# Patient Record
Sex: MALE | Race: BLACK OR AFRICAN AMERICAN | NOT HISPANIC OR LATINO | URBAN - METROPOLITAN AREA
[De-identification: names, ages, dates, MRNs, and addresses within clinical notes are randomized per-mention and may not be internally consistent; named-entity substitution may affect disease eponyms.]

---

## 2020-10-18 ENCOUNTER — EMERGENCY (EMERGENCY)
Facility: HOSPITAL | Age: 50
LOS: 1 days | Discharge: HOME | End: 2020-10-18
Attending: EMERGENCY MEDICINE
Payer: MEDICAID

## 2020-10-18 VITALS
WEIGHT: 259.93 LBS | HEART RATE: 104 BPM | SYSTOLIC BLOOD PRESSURE: 164 MMHG | DIASTOLIC BLOOD PRESSURE: 92 MMHG | OXYGEN SATURATION: 100 % | RESPIRATION RATE: 19 BRPM | HEIGHT: 70 IN | TEMPERATURE: 98 F

## 2020-10-18 LAB
ALBUMIN SERPL ELPH-MCNC: 4.4 G/DL — SIGNIFICANT CHANGE UP (ref 3.3–5)
ALP SERPL-CCNC: 75 U/L — SIGNIFICANT CHANGE UP (ref 40–120)
ALT FLD-CCNC: 27 U/L — SIGNIFICANT CHANGE UP (ref 10–45)
ANION GAP SERPL CALC-SCNC: 12 MMOL/L — SIGNIFICANT CHANGE UP (ref 5–17)
AST SERPL-CCNC: 28 U/L — SIGNIFICANT CHANGE UP (ref 10–40)
BASOPHILS # BLD AUTO: 0.03 K/UL — SIGNIFICANT CHANGE UP (ref 0–0.2)
BASOPHILS NFR BLD AUTO: 0.2 % — SIGNIFICANT CHANGE UP (ref 0–2)
BILIRUB SERPL-MCNC: 1 MG/DL — SIGNIFICANT CHANGE UP (ref 0.2–1.2)
BUN SERPL-MCNC: 11 MG/DL — SIGNIFICANT CHANGE UP (ref 7–23)
CALCIUM SERPL-MCNC: 9.8 MG/DL — SIGNIFICANT CHANGE UP (ref 8.4–10.5)
CHLORIDE SERPL-SCNC: 103 MMOL/L — SIGNIFICANT CHANGE UP (ref 96–108)
CO2 SERPL-SCNC: 25 MMOL/L — SIGNIFICANT CHANGE UP (ref 22–31)
CREAT SERPL-MCNC: 1.35 MG/DL — HIGH (ref 0.5–1.3)
EOSINOPHIL # BLD AUTO: 0 K/UL — SIGNIFICANT CHANGE UP (ref 0–0.5)
EOSINOPHIL NFR BLD AUTO: 0 % — SIGNIFICANT CHANGE UP (ref 0–6)
GLUCOSE SERPL-MCNC: 151 MG/DL — HIGH (ref 70–99)
HCT VFR BLD CALC: 49.2 % — SIGNIFICANT CHANGE UP (ref 39–50)
HGB BLD-MCNC: 15.9 G/DL — SIGNIFICANT CHANGE UP (ref 13–17)
IMM GRANULOCYTES NFR BLD AUTO: 0.5 % — SIGNIFICANT CHANGE UP (ref 0–1.5)
LIDOCAIN IGE QN: 19 U/L — SIGNIFICANT CHANGE UP (ref 7–60)
LYMPHOCYTES # BLD AUTO: 0.96 K/UL — LOW (ref 1–3.3)
LYMPHOCYTES # BLD AUTO: 6.8 % — LOW (ref 13–44)
MCHC RBC-ENTMCNC: 26.4 PG — LOW (ref 27–34)
MCHC RBC-ENTMCNC: 32.3 GM/DL — SIGNIFICANT CHANGE UP (ref 32–36)
MCV RBC AUTO: 81.6 FL — SIGNIFICANT CHANGE UP (ref 80–100)
MONOCYTES # BLD AUTO: 0.76 K/UL — SIGNIFICANT CHANGE UP (ref 0–0.9)
MONOCYTES NFR BLD AUTO: 5.4 % — SIGNIFICANT CHANGE UP (ref 2–14)
NEUTROPHILS # BLD AUTO: 12.35 K/UL — HIGH (ref 1.8–7.4)
NEUTROPHILS NFR BLD AUTO: 87.1 % — HIGH (ref 43–77)
NRBC # BLD: 0 /100 WBCS — SIGNIFICANT CHANGE UP (ref 0–0)
PLATELET # BLD AUTO: 152 K/UL — SIGNIFICANT CHANGE UP (ref 150–400)
POTASSIUM SERPL-MCNC: 4.1 MMOL/L — SIGNIFICANT CHANGE UP (ref 3.5–5.3)
POTASSIUM SERPL-SCNC: 4.1 MMOL/L — SIGNIFICANT CHANGE UP (ref 3.5–5.3)
PROT SERPL-MCNC: 8.2 G/DL — SIGNIFICANT CHANGE UP (ref 6–8.3)
RBC # BLD: 6.03 M/UL — HIGH (ref 4.2–5.8)
RBC # FLD: 13.8 % — SIGNIFICANT CHANGE UP (ref 10.3–14.5)
SODIUM SERPL-SCNC: 140 MMOL/L — SIGNIFICANT CHANGE UP (ref 135–145)
WBC # BLD: 14.17 K/UL — HIGH (ref 3.8–10.5)
WBC # FLD AUTO: 14.17 K/UL — HIGH (ref 3.8–10.5)

## 2020-10-18 PROCEDURE — 99285 EMERGENCY DEPT VISIT HI MDM: CPT

## 2020-10-18 RX ORDER — SODIUM CHLORIDE 9 MG/ML
1000 INJECTION, SOLUTION INTRAVENOUS ONCE
Refills: 0 | Status: COMPLETED | OUTPATIENT
Start: 2020-10-18 | End: 2020-10-18

## 2020-10-18 RX ORDER — ONDANSETRON 8 MG/1
4 TABLET, FILM COATED ORAL ONCE
Refills: 0 | Status: COMPLETED | OUTPATIENT
Start: 2020-10-18 | End: 2020-10-18

## 2020-10-18 RX ORDER — MORPHINE SULFATE 50 MG/1
4 CAPSULE, EXTENDED RELEASE ORAL ONCE
Refills: 0 | Status: DISCONTINUED | OUTPATIENT
Start: 2020-10-18 | End: 2020-10-18

## 2020-10-18 RX ADMIN — ONDANSETRON 4 MILLIGRAM(S): 8 TABLET, FILM COATED ORAL at 22:39

## 2020-10-18 RX ADMIN — SODIUM CHLORIDE 1000 MILLILITER(S): 9 INJECTION, SOLUTION INTRAVENOUS at 22:39

## 2020-10-18 RX ADMIN — MORPHINE SULFATE 4 MILLIGRAM(S): 50 CAPSULE, EXTENDED RELEASE ORAL at 22:39

## 2020-10-18 NOTE — ED PROVIDER NOTE - OBJECTIVE STATEMENT
50 year old otherwise healthy male presenting with RLQ abdominal pain and N/V x 1 day. Patient states waking up this morning with sensation of abdomen feeling "bubbly", 50 year old otherwise healthy male presenting with RLQ abdominal pain and N/V x 1 day. Patient states waking up this morning with sensation of abdomen feeling "bubbly", ate lunch, began to feel nausea and had 7-8 episodes of NBNB vomiting. Also reports gradual onset of lower abdominal pain greatest in RLQ, described as constant pressure, now 8/10. Has not had BM since yesterday, feels urge to defecate but unable to. States urine today was also "dark brown". Denies fever, chills, diarrhea, 50 year old otherwise healthy male presenting with RLQ abdominal pain and N/V x 1 day. Patient states waking up this morning with sensation of abdomen feeling "bubbly", ate lunch, began to feel nausea and had 7-8 episodes of NBNB vomiting. Also reports gradual onset of lower abdominal pain greatest in RLQ, described as constant pressure, now 8/10. Has not had BM since yesterday, feels urge to defecate but unable to. States urine today was also "dark brown". Denies fever, chills, diarrhea, inability to pass gas, previous abdominal surgeries, syncope.

## 2020-10-18 NOTE — ED ADULT NURSE NOTE - VOIDING
[No Acute Distress] : no acute distress [Well Nourished] : well nourished [Well Developed] : well developed [Well-Appearing] : well-appearing [Normal Sclera/Conjunctiva] : normal sclera/conjunctiva [PERRL] : pupils equal round and reactive to light [EOMI] : extraocular movements intact [Normal Outer Ear/Nose] : the outer ears and nose were normal in appearance [Normal Oropharynx] : the oropharynx was normal [Normal TMs] : both tympanic membranes were normal without difficulty [Normal Nasal Mucosa] : the nasal mucosa was normal [No JVD] : no jugular venous distention [Supple] : supple [No Lymphadenopathy] : no lymphadenopathy [Thyroid Normal, No Nodules] : the thyroid was normal and there were no nodules present [No Respiratory Distress] : no respiratory distress  [Clear to Auscultation] : lungs were clear to auscultation bilaterally [No Accessory Muscle Use] : no accessory muscle use [Normal Rate] : normal rate  [Regular Rhythm] : with a regular rhythm [Normal S1, S2] : normal S1 and S2 [No Murmur] : no murmur heard [Soft] : abdomen soft [Non Tender] : non-tender [Non-distended] : non-distended [No Masses] : no abdominal mass palpated [Normal Posterior Cervical Nodes] : no posterior cervical lymphadenopathy [Normal Anterior Cervical Nodes] : no anterior cervical lymphadenopathy [No CVA Tenderness] : no CVA  tenderness [No Spinal Tenderness] : no spinal tenderness [No Joint Swelling] : no joint swelling [Grossly Normal Strength/Tone] : grossly normal strength/tone [No Rash] : no rash [Normal Gait] : normal gait [Coordination Grossly Intact] : coordination grossly intact [No Focal Deficits] : no focal deficits [Deep Tendon Reflexes (DTR)] : deep tendon reflexes were 2+ and symmetric [Normal Affect] : the affect was normal [Normal Insight/Judgement] : insight and judgment were intact [de-identified] : tender b/l sinuses frontal.

## 2020-10-18 NOTE — ED PROVIDER NOTE - PROGRESS NOTE DETAILS
[facility] PGY-1: Discussed urology recs for patient to stay in CDU overnight for AM labs and pain control. Patient expresses desire to be discharge home with outpatient follow up, would like to try pain control at home. Declines CDU at this time. Discussed return precautions and need for close follow up. Patient expresses clear understanding. The radiology read was discussed with urology who recommended CDU placement and hydration.  Patient with 2mm stone and Creat of 1.3.  Patient defers further evaluation and observation in CDU and we will obtain close urology follow-up as outpatient.  Patient currently stable.

## 2020-10-18 NOTE — ED PROVIDER NOTE - PHYSICAL EXAMINATION
Gen - NAD; well appearing; A+Ox3   HEENT - NCAT, EOMI, PERRL, moist mucous membranes  Neck - supple, no LAD or swelling  Resp - CTAB, symmetric breath sounds  CV -  RRR, no murmurs  Abd - obese abdomen; soft, ND, tender mildly in the RLQ; no guarding or rebound, negative resendiz's, normoactive BS throughout  MSK - 5/5 strength and FROM b/l UE and LE  Extrem - 3+ distal pulses b/L UE and LE; no cyanosis, clubbing, or edema  Skin - no rash or bruising, warm and well perfused  Neuro - no focal motor or sensation deficits

## 2020-10-18 NOTE — ED PROVIDER NOTE - NSFOLLOWUPINSTRUCTIONS_ED_ALL_ED_FT
YOU WERE SEEN IN THE ED FOR: KIDNEY STONE    YOU WERE PRESCRIBED: FLOMAX AND OXYCODONE  FOLLOW THE INSTRUCTIONS ON THE LABEL/CONTAINER    FOR PAIN/FEVER, YOU MAY TAKE TYLENOL (acetaminophen) AND/OR IBUPROFEN (advil or motrin). FOLLOW THE INSTRUCTIONS ON THE LABEL/CONTAINER.    PLEASE FOLLOW UP WITH OUR UROLOGY CLINIC IN THE NEXT 3-5 DAYS AS DISCUSSED.    RETURN TO THE EMERGENCY DEPARTMENT IF YOU EXPERIENCE ANY NEW/CONCERNING/WORSENING SYMPTOMS SUCH AS BUT NOT LIMITED TO: FEVER, CHILLS, FAINTING, PERSISTENT VOMITING, BLEEDING

## 2020-10-18 NOTE — ED ADULT NURSE NOTE - NSIMPLEMENTINTERV_GEN_ALL_ED
Implemented All Universal Safety Interventions:  [place] to call system. Call bell, personal items and telephone within reach. Instruct patient to call for assistance. Room bathroom lighting operational. Non-slip footwear when patient is off stretcher. Physically safe environment: no spills, clutter or unnecessary equipment. Stretcher in lowest position, wheels locked, appropriate side rails in place.

## 2020-10-18 NOTE — ED PROVIDER NOTE - NSFOLLOWUPCLINICS_GEN_ALL_ED_FT
[facility] - Urology  Urology  [address]th [place] [address]  Phone: [phone]  Fax:   Follow Up Time:

## 2020-10-18 NOTE — ED ADULT NURSE NOTE - OBJECTIVE STATEMENT
50 y.o. male presents to ED c/o RLQ pain starting today. Pt reports the pain is a constant "cramping, bubbling" sensation. Pt reports multiple episodes of nonbloody emesis and one episode of "brown urine." Pt reports "feeling of having to make a BM but unable to." Pt denies burning with urination, increased frequency, blood in urine/stool, CP, SOB, fevers, chills, body aches. Upon assessment, pt a&Ox3, nad, no increased WoB noted, skin warm and appropriate color, no diaphoresis noted, abdomen soft, tenderness noted to RLQ palpation. MD at bedside. Pt safety and comfort provided.

## 2020-10-18 NOTE — ED PROVIDER NOTE - PATIENT PORTAL LINK FT
You can access the FollowMyHealth Patient Portal offered by [facility] by registering at the following website: [email or web address]. By joining [employer]’s FollowMyHealth portal, you will also be able to view your health information using other applications (apps) compatible with our system.

## 2020-10-18 NOTE — ED PROVIDER NOTE - NS ED ROS FT
Gen: No fever, no weight loss, no fatigue  CV: No chest pain, no palpitations  HEENT: No sore throat, no hoarseness  Skin: No rash, no color changes  Resp: No SOB, no cough  Endo: No sensitivity to heat or cold, no appetite changes  GI: No constipation, no diarrhea, +nausea, +vomiting  Msk: No back pain, no LE swelling, no extremity pain  : No dysuria, no increased frequency  Neuro: No LOC, no weakness, no numbness

## 2020-10-19 VITALS
HEART RATE: 65 BPM | OXYGEN SATURATION: 100 % | RESPIRATION RATE: 18 BRPM | SYSTOLIC BLOOD PRESSURE: 130 MMHG | TEMPERATURE: 98 F | DIASTOLIC BLOOD PRESSURE: 78 MMHG

## 2020-10-19 LAB
APPEARANCE UR: CLEAR — SIGNIFICANT CHANGE UP
BACTERIA # UR AUTO: NEGATIVE — SIGNIFICANT CHANGE UP
BILIRUB UR-MCNC: NEGATIVE — SIGNIFICANT CHANGE UP
COLOR SPEC: YELLOW — SIGNIFICANT CHANGE UP
DIFF PNL FLD: ABNORMAL
EPI CELLS # UR: 1 /HPF — SIGNIFICANT CHANGE UP
GLUCOSE UR QL: ABNORMAL
HYALINE CASTS # UR AUTO: 0 /LPF — SIGNIFICANT CHANGE UP (ref 0–2)
KETONES UR-MCNC: ABNORMAL
LEUKOCYTE ESTERASE UR-ACNC: NEGATIVE — SIGNIFICANT CHANGE UP
NITRITE UR-MCNC: NEGATIVE — SIGNIFICANT CHANGE UP
PH UR: 8 — SIGNIFICANT CHANGE UP (ref 5–8)
PROT UR-MCNC: ABNORMAL
RBC CASTS # UR COMP ASSIST: 5 /HPF — HIGH (ref 0–4)
SP GR SPEC: 1.03 — HIGH (ref 1.01–1.02)
UROBILINOGEN FLD QL: NEGATIVE — SIGNIFICANT CHANGE UP
WBC UR QL: 1 /HPF — SIGNIFICANT CHANGE UP (ref 0–5)

## 2020-10-19 PROCEDURE — 83690 ASSAY OF LIPASE: CPT

## 2020-10-19 PROCEDURE — 99283 EMERGENCY DEPT VISIT LOW MDM: CPT

## 2020-10-19 PROCEDURE — 81001 URINALYSIS AUTO W/SCOPE: CPT

## 2020-10-19 PROCEDURE — 85025 COMPLETE CBC W/AUTO DIFF WBC: CPT

## 2020-10-19 PROCEDURE — 80053 COMPREHEN METABOLIC PANEL: CPT

## 2020-10-19 PROCEDURE — 74177 CT ABD & PELVIS W/CONTRAST: CPT

## 2020-10-19 PROCEDURE — 74177 CT ABD & PELVIS W/CONTRAST: CPT | Mod: 26

## 2020-10-19 PROCEDURE — 96374 THER/PROPH/DIAG INJ IV PUSH: CPT | Mod: XU

## 2020-10-19 PROCEDURE — 96375 TX/PRO/DX INJ NEW DRUG ADDON: CPT

## 2020-10-19 PROCEDURE — 99284 EMERGENCY DEPT VISIT MOD MDM: CPT | Mod: 25

## 2020-10-19 RX ORDER — TAMSULOSIN HYDROCHLORIDE 0.4 MG/1
1 CAPSULE ORAL
Qty: 30 | Refills: 0
Start: 2020-10-19 | End: 2020-11-17

## 2020-10-19 RX ORDER — ONDANSETRON 8 MG/1
1 TABLET, FILM COATED ORAL
Qty: 9 | Refills: 0
Start: 2020-10-19 | End: 2020-10-21

## 2020-10-19 RX ORDER — TAMSULOSIN HYDROCHLORIDE 0.4 MG/1
1 CAPSULE ORAL
Qty: 60 | Refills: 0
Start: 2020-10-19 | End: 2020-10-28

## 2020-10-19 RX ORDER — OXYCODONE HYDROCHLORIDE 5 MG/1
1 TABLET ORAL
Qty: 12 | Refills: 0
Start: 2020-10-19 | End: 2020-10-21

## 2020-10-19 NOTE — CONSULT NOTE ADULT - SUBJECTIVE AND OBJECTIVE BOX
pt 49 y/o M,  came to the ED c/o RLQ pain since noon. pt states waking up this morning with sensation of abdomen feeling "bubbly", ate lunch, start has this RLQ pain. The pain was sharp, 9/10, constant, and  no radiation. Had (+/-) nausea and vomited. Denies fever and chills. Denies hematuria, dysuria nor any other urinary symptoms. Urine is tea color. Patient had CT abdominal found to have a 2mm distal stone in the R ureter w/ Hydronephrosis.         PAST MEDICAL & SURGICAL HISTORY:  No pertinent past medical history    No significant past surgical history      FAMILY HISTORY:    SOCIAL HISTORY:   Tobacco hx:  No Known Allergies      REVIEW OF SYSTEMS: Pertinent positives and negatives as stated in HPI, otherwise negative    Vital signs      Output      Physical Exam  Gen: NAD  Pulm: No respiratory distress, no subcostal retractions  CV: RRR, no JVD  Abd: Soft, RLQ tenderness, ND  Back: no CVAT B/L  : voiding free, tea color urine  MSK: No edema present    LABS:        10-18 @ 22:08    WBC 14.17 / Hct 49.2  / SCr 1.35     10-18    140  |  103  |  11  ----------------------------<  151<H>  4.1   |  25  |  1.35<H>    Ca    9.8      18 Oct 2020 22:08    TPro  8.2  /  Alb  4.4  /  TBili  1.0  /  DBili  x   /  AST  28  /  ALT  27  /  AlkPhos  75  10-18      Urinalysis Basic - ( 19 Oct 2020 01:41 )    Color: Yellow / Appearance: Clear / S.034 / pH: x  Gluc: x / Ketone: Small  / Bili: Negative / Urobili: Negative   Blood: x / Protein: 30 mg/dL / Nitrite: Negative   Leuk Esterase: Negative / RBC: 5 /hpf / WBC 1 /HPF   Sq Epi: x / Non Sq Epi: 1 /hpf / Bacteria: Negative        Urine Cx:   Blood Cx:    Radiology:  < from: CT Abdomen and Pelvis w/ Oral Cont and w/ IV Cont (10.19.20 @ 00:12) >    Right kidney/ureter: Mild right hydronephrosis and mild right hydroureter to the level of a 2 mm distal ureteral calculus (2:108). Delayed right renal nephrogram and perinephric fat stranding and free fluid. These constellations of findings are concerning for acute obstructive uropathy possible forniceal rupture. Punctate nonobstructing calculus in the lower pole of the right kidney and 601:56. 1.3 cm cyst.    Left kidney/ureter: Within normal limits. Subcentimeter hypodensity which is too small to characterize.    BLADDER: Within normal limits.  REPRODUCTIVE ORGANS: Prostate is enlarged.    BOWEL: No bowel obstruction or bowel wall thickening. Appendix is normal.  PERITONEUM: Small amount of fluid tracking along the perirenal space.  VESSELS: Within normal limits.  RETROPERITONEUM/LYMPH NODES: No lymphadenopathy.  ABDOMINAL WALL: Within normal limits.  BONES: Within normal limits.    IMPRESSION:  Constellation of findings compatible with acute right obstructive uropathy and possible forniceal rupture, as described above.      < end of copied text >         pt 51 y/o M,  came to the ED c/o RLQ pain since noon. pt states waking up this morning with sensation of abdomen feeling "bubbly", ate lunch, start has this RLQ pain. The pain was sharp, 9/10, constant, and  no radiation. Had nausea and vomited. Denies fever and chills. Denies hematuria, dysuria nor any other urinary symptoms. Urine is tea color. Patient had CT abdominal found to have a 2mm distal stone in the R ureter w/ Hydronephrosis.         PAST MEDICAL & SURGICAL HISTORY:  No pertinent past medical history    No significant past surgical history      FAMILY HISTORY:    SOCIAL HISTORY:   Tobacco hx:  No Known Allergies      REVIEW OF SYSTEMS: Pertinent positives and negatives as stated in HPI, otherwise negative    Vital signs      Output      Physical Exam  Gen: NAD  Pulm: No respiratory distress, no subcostal retractions  CV: RRR, no JVD  Abd: Soft, RLQ tenderness, ND  Back: no CVAT B/L  : voiding free, tea color urine  MSK: No edema present    LABS:        10-18 @ 22:08    WBC 14.17 / Hct 49.2  / SCr 1.35     10-18    140  |  103  |  11  ----------------------------<  151<H>  4.1   |  25  |  1.35<H>    Ca    9.8      18 Oct 2020 22:08    TPro  8.2  /  Alb  4.4  /  TBili  1.0  /  DBili  x   /  AST  28  /  ALT  27  /  AlkPhos  75  10-18      Urinalysis Basic - ( 19 Oct 2020 01:41 )    Color: Yellow / Appearance: Clear / S.034 / pH: x  Gluc: x / Ketone: Small  / Bili: Negative / Urobili: Negative   Blood: x / Protein: 30 mg/dL / Nitrite: Negative   Leuk Esterase: Negative / RBC: 5 /hpf / WBC 1 /HPF   Sq Epi: x / Non Sq Epi: 1 /hpf / Bacteria: Negative        Urine Cx:   Blood Cx:    Radiology:  < from: CT Abdomen and Pelvis w/ Oral Cont and w/ IV Cont (10.19.20 @ 00:12) >    Right kidney/ureter: Mild right hydronephrosis and mild right hydroureter to the level of a 2 mm distal ureteral calculus (2:108). Delayed right renal nephrogram and perinephric fat stranding and free fluid. These constellations of findings are concerning for acute obstructive uropathy possible forniceal rupture. Punctate nonobstructing calculus in the lower pole of the right kidney and 601:56. 1.3 cm cyst.    Left kidney/ureter: Within normal limits. Subcentimeter hypodensity which is too small to characterize.    BLADDER: Within normal limits.  REPRODUCTIVE ORGANS: Prostate is enlarged.    BOWEL: No bowel obstruction or bowel wall thickening. Appendix is normal.  PERITONEUM: Small amount of fluid tracking along the perirenal space.  VESSELS: Within normal limits.  RETROPERITONEUM/LYMPH NODES: No lymphadenopathy.  ABDOMINAL WALL: Within normal limits.  BONES: Within normal limits.    IMPRESSION:  Constellation of findings compatible with acute right obstructive uropathy and possible forniceal rupture, as described above.      < end of copied text >

## 2020-10-19 NOTE — ED POST DISCHARGE NOTE - ADDITIONAL DOCUMENTATION
pharmacy called and lvm as well as patient stating flomax written q4h. New Rx sent, called pharmacy to notify of change, canceled incorrect Rx. also called patient to ensure understanding of the dosage of each medication - Swathi Scott PA-C

## 2020-10-19 NOTE — CONSULT NOTE ADULT - ASSESSMENT
pt 49 y/o M,  came to the ED c/o RLQ pain since noon. pt states waking up this morning with sensation of abdomen feeling "bubbly", ate lunch, start has this RLQ pain. The pain was sharp, 9/10, constant, and  no radiation. Had (+/-) nausea and vomited. Denies fever and chills. Denies hematuria, dysuria nor any other urinary symptoms. Urine is tea color. Patient had CT abdominal found to have a 2mm distal stone in the R ureter w/ Hydronephrosis. Currently pt AVSS, pain in control. Elevated WBC and Cr.     plan   pt 49 y/o M,  came to the ED c/o RLQ pain since noon. pt states waking up this morning with sensation of abdomen feeling "bubbly", ate lunch, start has this RLQ pain. The pain was sharp, 9/10, constant, and  no radiation. Had (+/-) nausea and vomited. Denies fever and chills. Denies hematuria, dysuria nor any other urinary symptoms. Urine is tea color. Patient had CT abdominal found to have a 2mm distal stone in the R ureter w/ Hydronephrosis. Currently pt AVSS, pain in control. Elevated WBC and Cr.     plan  No urgent urologic intervention at this time   Recommend CDU for observation due possible forniceal rupture   Repeat lab at AM  Recommend medical expulsion therapy with adequate hydration  Flomax 0.4mg qhs  PO analgesia  strain urine for calculi  f/u urine culture  regular diet   pt 49 y/o M,  came to the ED c/o RLQ pain since noon. pt states waking up this morning with sensation of abdomen feeling "bubbly", ate lunch, start has this RLQ pain. The pain was sharp, 9/10, constant, and  no radiation. Had (+/-) nausea and vomited. Denies fever and chills. Denies hematuria, dysuria nor any other urinary symptoms. Urine is tea color. Patient had CT abdominal found to have a 2mm distal stone in the R ureter w/ Hydronephrosis. Currently pt AVSS, pain in control. Elevated WBC and Cr.     plan  No urgent urologic intervention at this time   Recommend CDU for observation due possible forniceal rupture  no need for intervention currently, but need to make sure creatinine is not rising  Repeat lab at AM  Recommend medical expulsion therapy with adequate hydration  Flomax 0.4mg qhs  PO analgesia  strain urine for calculi  f/u urine culture  regular diet

## 2020-10-29 ENCOUNTER — APPOINTMENT (OUTPATIENT)
Dept: UROLOGY | Facility: CLINIC | Age: 50
End: 2020-10-29
Payer: MEDICAID

## 2020-10-29 VITALS
WEIGHT: 257 LBS | RESPIRATION RATE: 18 BRPM | OXYGEN SATURATION: 98 % | DIASTOLIC BLOOD PRESSURE: 103 MMHG | HEART RATE: 90 BPM | TEMPERATURE: 97.2 F | BODY MASS INDEX: 38.06 KG/M2 | SYSTOLIC BLOOD PRESSURE: 167 MMHG | HEIGHT: 69 IN

## 2020-10-29 DIAGNOSIS — N13.30 UNSPECIFIED HYDRONEPHROSIS: ICD-10-CM

## 2020-10-29 DIAGNOSIS — N20.1 CALCULUS OF URETER: ICD-10-CM

## 2020-10-29 DIAGNOSIS — N23 UNSPECIFIED RENAL COLIC: ICD-10-CM

## 2020-10-29 DIAGNOSIS — Z78.9 OTHER SPECIFIED HEALTH STATUS: ICD-10-CM

## 2020-10-29 PROCEDURE — 99204 OFFICE O/P NEW MOD 45 MIN: CPT

## 2020-10-29 RX ORDER — OXYCODONE HYDROCHLORIDE 30 MG/1
TABLET ORAL
Refills: 0 | Status: ACTIVE | COMMUNITY

## 2020-10-29 RX ORDER — TAMSULOSIN HYDROCHLORIDE 0.4 MG/1
0.4 CAPSULE ORAL
Refills: 0 | Status: ACTIVE | COMMUNITY

## 2020-10-29 NOTE — ASSESSMENT
[FreeTextEntry1] : Patient is a 49 yo M who presents with R renal colic, R distal 2 mm ureteral stone with hydro.\par \par D/w pt that stone should spontaneously pass - would plan for renal ULT in 2 wks to assess and observe.\par D/w pt he should increase fluid hydration.  Counseled pt on stone dietary recommendations, such as increasing fluid intake and citrate intake (sanjeev, grapefruit), while decreasing salt, protein and oxalate. \par For constipation encouraged pt to hydrate and take stool softeners\par For itch, d/w pt may be medication related, instructed to hold flomax for a few days to determine if itch resolves\par If no change, then cont flomax\par Strain urine\par Urine for cx\par D/w pt that if develops fever/chills or worsening symptoms to go back to ER for intervention\par Suspect BP related to renal colic, will check BP next visit as pt otherwise no symptoms\par F/u 2 wks\par

## 2020-10-29 NOTE — HISTORY OF PRESENT ILLNESS
[FreeTextEntry1] : Patient is a 51 y/o M who presents for R renal colic.  He initially presented to the ED c/o RLQ pain since noon 10/19.  Pt states waking up this date/morning with sensation of abdomen feeling "bubbly", ate lunch, start has this RLQ pain. The pain was sharp, 9/10, constant, and no radiation. Had nausea and\par vomited. Denies fever and chills. Denies hematuria, dysuria nor any other urinary symptoms. Urine is tea color. Patient had CT abdominal found to have a 2mm distal stone in the R ureter w/ Hydronephrosis.\par \par He was sent home with flomax and oxycodone.  He is only taking flomax and ibuprofen.  Minimal improved pain, more in R flank pain.  No urinary symptoms.  1st stone episode.\par \par He does report significant constipation.  First BM this morning in 5 days.  Also reports significant itching all over his body.

## 2020-10-29 NOTE — PHYSICAL EXAM
[General Appearance - Well Developed] : well developed [General Appearance - Well Nourished] : well nourished [Normal Appearance] : normal appearance [Well Groomed] : well groomed [General Appearance - In No Acute Distress] : no acute distress [Edema] : no peripheral edema [] : no respiratory distress [Respiration, Rhythm And Depth] : normal respiratory rhythm and effort [Exaggerated Use Of Accessory Muscles For Inspiration] : no accessory muscle use [Abdomen Soft] : soft [Abdomen Tenderness] : non-tender [Costovertebral Angle Tenderness] : no ~M costovertebral angle tenderness [Urinary Bladder Findings] : the bladder was normal on palpation [Normal Station and Gait] : the gait and station were normal for the patient's age [Skin Color & Pigmentation] : normal skin color and pigmentation [Skin Turgor] : supple [No Focal Deficits] : no focal deficits [Oriented To Time, Place, And Person] : oriented to person, place, and time [Affect] : the affect was normal [Mood] : the mood was normal [Not Anxious] : not anxious

## 2020-11-02 ENCOUNTER — NON-APPOINTMENT (OUTPATIENT)
Age: 50
End: 2020-11-02

## 2020-11-02 LAB — BACTERIA UR CULT: NORMAL

## 2020-11-12 ENCOUNTER — APPOINTMENT (OUTPATIENT)
Dept: UROLOGY | Facility: CLINIC | Age: 50
End: 2020-11-12

## 2022-07-07 ENCOUNTER — EMERGENCY (EMERGENCY)
Facility: HOSPITAL | Age: 52
LOS: 1 days | Discharge: HOME | End: 2022-07-07
Attending: EMERGENCY MEDICINE
Payer: MEDICAID

## 2022-07-07 VITALS
HEART RATE: 76 BPM | DIASTOLIC BLOOD PRESSURE: 92 MMHG | OXYGEN SATURATION: 97 % | HEIGHT: 70 IN | RESPIRATION RATE: 17 BRPM | TEMPERATURE: 98 F | SYSTOLIC BLOOD PRESSURE: 163 MMHG | WEIGHT: 259.93 LBS

## 2022-07-07 PROCEDURE — 99284 EMERGENCY DEPT VISIT MOD MDM: CPT

## 2022-07-07 NOTE — ED ADULT TRIAGE NOTE - HEIGHT IN INCHES
CM: Called Delia at [facility] to update that MD note indicated pt here 4-7 days yet    SW has not heard from [facility] or [facility] at this time.. Following     CM Met with pt and destiney ( SO) updated that at this time we have a TCU bed offer at [facility].. Pt and SO would like SW to continue to look for other options.   SW will keep sending referrals but has accepted bed for possible needs.     Addendum    Xavi from [facility] called.. The are following for their TCU unit.. No beds through the weekend. IF still here on Sat/Sun he recommends sending new referral for Mon/Tue     10

## 2022-07-08 VITALS
SYSTOLIC BLOOD PRESSURE: 135 MMHG | OXYGEN SATURATION: 98 % | HEART RATE: 75 BPM | DIASTOLIC BLOOD PRESSURE: 90 MMHG | RESPIRATION RATE: 18 BRPM

## 2022-07-08 LAB
RAPID RVP RESULT: DETECTED
S PYO AG SPEC QL IA: NEGATIVE — SIGNIFICANT CHANGE UP
SARS-COV-2 RNA SPEC QL NAA+PROBE: DETECTED

## 2022-07-08 PROCEDURE — 99285 EMERGENCY DEPT VISIT HI MDM: CPT

## 2022-07-08 PROCEDURE — 0225U NFCT DS DNA&RNA 21 SARSCOV2: CPT

## 2022-07-08 PROCEDURE — 87880 STREP A ASSAY W/OPTIC: CPT

## 2022-07-08 PROCEDURE — 87081 CULTURE SCREEN ONLY: CPT

## 2022-07-08 RX ORDER — ACETAMINOPHEN 500 MG
975 TABLET ORAL ONCE
Refills: 0 | Status: COMPLETED | OUTPATIENT
Start: 2022-07-08 | End: 2022-07-08

## 2022-07-08 RX ADMIN — Medication 975 MILLIGRAM(S): at 01:51

## 2022-07-08 NOTE — ED ADULT NURSE NOTE - OBJECTIVE STATEMENT
52 YO male with no stated PMH/PSH via walk in presenting with complaints of sore throat. As per patient, pt has had sore throat since yesterday, associated with chills, cough, not feeling well, and worsening difficulty swallowing. Pt states that he gets many strep infections. Pt states he was supposed to have tonsils removed, but never did. Pt denies chest pain, palpitations, shortness of breath, headache, visual disturbances, numbness/tingling, diaphoresis,  nausea, vomiting, constipation, diarrhea, or urinary symptoms.

## 2022-07-08 NOTE — ED ADULT NURSE NOTE - ED STAT RN HANDOFF DETAILS
Handoff report provided to oncoming nurse Hayley Cisneros RN. Understands pmh, medications given, and plan of care for patient. Patient in stable condition, vital signs updated, and patient has no complaints at this time and has been updated on care plan. Explained to patient that new nurse is taking over, pt verbalized understanding.

## 2022-07-08 NOTE — ED PROVIDER NOTE - PHYSICAL EXAMINATION
Gen: Patient is well-appearing, NAD, AAOx3, able to ambulate without assistance  HEENT: NCAT, normal conjunctiva, tongue midline, oral mucosa moist, no exudate noted on oral cavity   Lung: CTAB, no respiratory distress, no wheezes/rhonchi/rales B/L, speaking in full sentences  CV: RRR, no murmurs, rubs or gallops, distal pulses 2+ b/l  Abd: soft, NT, ND, no guarding, no rigidity, no rebound tenderness  MSK: no visible deformities, ROM normal in UE/LE  Neuro: No focal sensory or motor deficits  Skin: Warm, well perfused, no rash, no leg swelling  Psych: normal affect, calm

## 2022-07-08 NOTE — ED PROVIDER NOTE - PATIENT PORTAL LINK FT
You can access the FollowMyHealth Patient Portal offered by [facility] by registering at the following website: [email or web address]. By joining [employer]’s FollowMyHealth portal, you will also be able to view your health information using other applications (apps) compatible with our system.

## 2022-07-08 NOTE — ED PROVIDER NOTE - PROGRESS NOTE DETAILS
Attending MD Mao: Strep negative.  Stable for discharge.  Culture pending. Follow up instructions given, importance of follow up emphasized, return to ED parameters reviewed and patient verbalized understanding.  All questions answered, all concerns addressed.

## 2022-07-08 NOTE — ED PROVIDER NOTE - OBJECTIVE STATEMENT
51 y M, PMH of frequent strep infection, presenting with 2-days onset of sore throat, generalized malaise, subjective fever, cough, difficulty swallowing. Covid test at home was negative. Vaccinated x2. Denies chest pain, shortness of breath. Reports frequent strep infection in the past. Last strep infection was in 2020. No other medical hx. No on any medications. 51 y M, PMH of frequent strep infection, presenting with 2-days onset of sore throat, generalized malaise, subjective fever, cough, painful swallowing. Covid test at home was negative. Vaccinated x2. Denies chest pain, shortness of breath. Reports frequent strep infection in the past. Last strep infection was in 2020. No other medical hx. No on any medications.

## 2022-07-08 NOTE — ED PROVIDER NOTE - ATTENDING CONTRIBUTION TO CARE
Attending MD Mao: I personally have seen and examined this patient.  Resident note reviewed and agree on plan of care and except where noted.  See below for details.     seen in [facility]    51M with PMH/PSH including frequent strep infections presents to the ED with two days of sore throat, subjective fever, non productive cough, malaise, pain with swallowing.  Reports lymph nodes in L neck.  Denies difficulty swallowing secretions.  Denies shortness of breath.  Reports feels similar to previous strep infections.  Denies chest pain, shortness of breath, palpitations. Reports Covid vaccinated.  Reports last strep infection was 2020.  Denies abdominal pain, nausea, vomiting, diarrhea, urinary complaints, dysuria, hematuria.      Exam:   General: NAD  HENT: head NCAT, airway patent, uvula midline, no tongue edema, mild pharyngeal erythema, no exudates, no vesicles, no stridor  Eyes: PERRL, no conjunctival injection   Lungs: lungs CTAB with good inspiratory effort, no wheezing, no rhonchi, no rales  Cardiac: +S1S2, no obvious m/r/g  GI: abdomen soft with +BS, NT, exam limited secondary to body habitus  : no CVAT  MSK: FROM at neck, no tenderness to midline palpation  Neuro: moving all extremities spontaneously, sensory grossly intact, no gross neuro deficits  Psych: normal mood and affect   Skin: no rash    A/P: 51M with recurrent strep infections, given recurrence will test but explained given centor low likelihood, will send RVP.  Explained most pharyngitis in adults viral, no need for abx.  Possible URI, possible Covid given prevalence.  Will await.

## 2022-07-08 NOTE — ED PROVIDER NOTE - NSFOLLOWUPINSTRUCTIONS_ED_ALL_ED_FT
Please take Tylenol 1000 mg every 8 hours (no more than 4000 mg per 24 hours) for fever, pain.     Please follow up with your Primary Medical Doctor within the next 1-3 days to monitor your symptoms.     Please come back to the Emergency Room if your symptoms get worse or if you start developing chest pain, difficulty breathing, unable to eat/drink.

## 2022-07-09 NOTE — ED POST DISCHARGE NOTE - DETAILS
7/9 Issa Larson PA-C: Left vm for 1522 cb. 7/10 left message with 1522 call back PAULETTE Hollis 7/10 aware of + COVID. advised quarentine per cdc recs and strict return precautions PAULETTE Hollis:

## 2024-01-24 NOTE — ED ADULT NURSE NOTE - NSFALLRSKOUTCOME_ED_ALL_ED
Creatine stable for now. BMP reviewed- noted Estimated Creatinine Clearance: 41.2 mL/min (A) (based on SCr of 1.6 mg/dL (H)). according to latest data. Based on current GFR, CKD stage is stage 3 - GFR 30-59.  Monitor UOP and serial BMP and adjust therapy as needed. Renally dose meds. Avoid nephrotoxic medications and procedures.   Universal Safety Interventions